# Patient Record
Sex: FEMALE | Race: WHITE | NOT HISPANIC OR LATINO | Employment: FULL TIME | URBAN - METROPOLITAN AREA
[De-identification: names, ages, dates, MRNs, and addresses within clinical notes are randomized per-mention and may not be internally consistent; named-entity substitution may affect disease eponyms.]

---

## 2018-04-24 ENCOUNTER — TELEPHONE (OUTPATIENT)
Dept: OTHER | Facility: CLINIC | Age: 28
End: 2018-04-24

## 2018-04-24 NOTE — TELEPHONE ENCOUNTER
4/24/2018    Call Regarding Onboarding [facility] CHOICE    Attempt 1    Message on voicemail     Comments:           Outreach   AT

## 2018-05-15 NOTE — TELEPHONE ENCOUNTER
05/15/2018     Call Regarding Onboarding  [facility] choices     Attempt 2    Message on voicemail    Comments:       Outreach   Ava Chandler

## 2022-03-06 ENCOUNTER — TRANSFERRED RECORDS (OUTPATIENT)
Dept: MULTI SPECIALTY CLINIC | Facility: CLINIC | Age: 32
End: 2022-03-06

## 2024-04-10 ENCOUNTER — OFFICE VISIT (OUTPATIENT)
Dept: FAMILY MEDICINE | Facility: CLINIC | Age: 34
End: 2024-04-10
Payer: COMMERCIAL

## 2024-04-10 VITALS
RESPIRATION RATE: 18 BRPM | OXYGEN SATURATION: 99 % | SYSTOLIC BLOOD PRESSURE: 111 MMHG | BODY MASS INDEX: 25.3 KG/M2 | HEIGHT: 70 IN | HEART RATE: 68 BPM | DIASTOLIC BLOOD PRESSURE: 76 MMHG | WEIGHT: 176.7 LBS | TEMPERATURE: 98.6 F

## 2024-04-10 DIAGNOSIS — M25.551 HIP PAIN, RIGHT: Primary | ICD-10-CM

## 2024-04-10 DIAGNOSIS — G89.29 CHRONIC PAIN OF RIGHT KNEE: ICD-10-CM

## 2024-04-10 DIAGNOSIS — R21 RASH AND NONSPECIFIC SKIN ERUPTION: ICD-10-CM

## 2024-04-10 DIAGNOSIS — M25.561 CHRONIC PAIN OF RIGHT KNEE: ICD-10-CM

## 2024-04-10 PROCEDURE — 99203 OFFICE O/P NEW LOW 30 MIN: CPT | Performed by: INTERNAL MEDICINE

## 2024-04-10 ASSESSMENT — PAIN SCALES - GENERAL: PAINLEVEL: NO PAIN (0)

## 2024-04-10 ASSESSMENT — ENCOUNTER SYMPTOMS: HIP PAIN: 1

## 2024-04-10 NOTE — COMMUNITY RESOURCES LIST (ENGLISH)
April 10, 2024           YOUR PERSONALIZED LIST OF SERVICES & PROGRAMS           & SHELTER    Housing      [address] [place] [address] - Violence Prevention Services - Domestic Violence Shelter  CHRISTIANNE Paulson [address] (Distance: 11.1 miles)  Phone: (228) 673-3254  Website: https://[address] [place] [address].org/  Language: English      [facility] - Shelter for individuals  [address] [place] [address] (Distance: 7.5 miles)  Language: English, Senegalese  Fee: Free      [facility] - Housing Stabilization Services  Phone: (314) 804-9073  Website: https://mimoOn/Housing-Stabilization.html  Language: English  Hours: Mon 9:00 AM - 5:00 PM Tue 9:00 AM - 5:00 PM Wed 9:00 AM - 5:00 PM Thu 9:00 AM - 5:00 PM Fri 9:00 AM - 5:00 PM  Fee: Insurance  Accessibility: Deaf or hard of hearing, Translation services    Case Management      [facility] - Housing search assistance  [address] [place] [address] (Distance: 9.2 miles)  Phone: (299) 853-8508  Language: English  Fee: Sliding scale, Self pay      [facility] - Housing search assistance  [address] [place] [address] (Distance: 17.1 miles)  Phone: (498) 214-6854  Language: English  Fee: Free, Sliding scale, Insurance  Accessibility: Translation services, [place] accessible, Blind accommodation, Deaf or hard of hearing      [facility] - Housing Stabilization Services  Phone: (531) 411-1206  Website: https://homebasemn.com/  Language: English  Hours: Mon 8:00 AM - 4:00 PM Tue 8:00 AM - 4:00 PM Wed 8:00 AM - 4:00 PM Thu 8:00 AM - 4:00 PM Fri 8:00 AM - 4:00 PM  Fee: Free  Accessibility: Blind accommodation, Deaf or hard of hearing  Transportation Options: Free transportation    Drop-In Services      [facility] - Warming or cooling center - [facility] - [employer]  [address] [place] [address] (Distance: 5.4 miles)  Language: English, Senegalese, [place] citizen  Fee:  Free      [facility] - Project Recovery  [address] [place] [address] [address] [place] [address] (Distance: 22.6 miles)  Phone: (143) [phone]  Language: English  Fee: Free      [facility]UNDRY LOVE  Website: http://www.laundrylove.org               IMPORTANT NUMBERS & WEBSITES        Emergency Services  911  .   [facility]  211 http://211unitedway.org  .   Poison Control  (562) 770-7363 http://[place]poison.org http://wis[place]poison.org  .     Suicide and Crisis Lifeline  988 http://988lifeline.org  .   [facility]help [place] Child Abuse Hotline  877.250.1699 http://Childhelphotline.org   .   National Sexual Assault Hotline  (795) 930-9783 (HOPE) http://Gigaclear.org   .     [place] Runaway Safeline  (892) 917-6928 (RUNAWAY) http://Sometrics.Networked Insights  .   Pregnancy & Postpartum Support  Call/text 762-699-3312  [place]: http://ppsupportmn.org  [place]: http://Attentio.com/wi  .   Substance Abuse National Helpline ([facility])  623-055-HELP (1558) http://Findtreatment.gov   .                DISCLAIMER: These resources have been generated via the [employer] Platform. [employer] does not endorse any service providers mentioned in this resource list. [employer] does not guarantee that the services mentioned in this resource list will be available to you or will improve your health or wellness.    [facility]

## 2024-04-10 NOTE — PROGRESS NOTES
"  Assessment & Plan   Problem List Items Addressed This Visit    None  Visit Diagnoses       Hip pain, right    -  Primary    Relevant Orders    Orthopedic  Referral    Physical Therapy  Referral    Chronic pain of right knee        Relevant Orders    Orthopedic  Referral    Physical Therapy  Referral    Rash and nonspecific skin eruption               Discussed differential of skin rash on the hands possible eczema/dermatitis versus postviral such as with coxsackievirus ,she does not have all the features of hand-foot-and-mouth disease and she did not have any preceding Viral symptoms, we will treat conservatively now with topical creams; moisturizers such as Aquaphor or Eucerin cream ,apply as needed especially after washing and can apply hydrocortisone 1% anti-itch cream on the lesions that are painful.  No joint involvement no systemic signs ,if rash recurs patient was asked to send us photos through [employer].    As for the right hip pain and right knee pain probably they are separate, but we will have sports medicine and PT evaluate ,patient exam was benign except for hyperflexion of the right hip with internal rotation elicited some discomfort.  Could be any from ligamentous injury to possible labral tear, I do not see evidence of synovitis from history or exam of the knee or the hip.  IT band syndrome unlikely given the history, the knee differential could be also meniscal or ligamentous injury versus some degenerative knee changes ; she is a long-term runner but the knee exam is benign and stable knee on exam ,can apply topical diclofenac gel as needed.  Can take anti-inflammatories such as Motrin Advil as needed over-the-counter.  Further recommendation per sports medicine evaluation     BMI  Estimated body mass index is 25.51 kg/m  as calculated from the following:    Height as of this encounter: 1.772 m (5' 9.78\").    Weight as of this encounter: 80.2 kg (176 lb 11.2 " oz).             Subjective   Letty is a 33 year old, presenting for the following health issues:  Hip Pain (2 PAIN SCORE, 4 wks ), Hand Problem (BUMPS, pain with movement, 8wks ), and Health Maintenance (CONI for gina physicians signed)        4/10/2024    10:25 AM   Additional Questions   Roomed by Lorna   Accompanied by Not applicable, by themselves     Via the Health Maintenance questionnaire, the patient has reported the following services have been completed -Cervical Cancer Screening, this information has been sent to the abstraction team.  History of Present Illness       Reason for visit:  Hip pain and painful hand bumps  Symptom onset:  3-4 weeks ago  Symptoms include:  Hip pain and painful bumps on hands  Symptom intensity:  Moderate  Symptom progression:  Staying the same  Had these symptoms before:  No  What makes it worse:  Hip-running,hands-none  What makes it better:  Hip-none hands-none    She eats 0-1 servings of fruits and vegetables daily.She consumes 0 sweetened beverage(s) daily.She exercises with enough effort to increase her heart rate 60 or more minutes per day.  She exercises with enough effort to increase her heart rate 6 days per week.   She is taking medications regularly.     Letty seeing first time in the clinic presenting for evaluation of #1 rash bumps on the hands  2.  Right groin hip pain  3.  Right knee pain.  The rash bumps on the hands noticed over the last 8 weeks between the knuckles mainly in the webspace around the thumb area bilateral.  Describes more of painful lesions/ bumps rather than itching.  This has been going on for the last 8 weeks.  also has formed a bit of ?scarring per pt.  No use of new detergent or soaps or creams or gloves.  No associated  joint pain or swelling.  No fever or chills.  No preceding URI symptoms.  Onset of 8 weeks duration.  She has a 2-year-old child has no similar rashes.  No history of STDs.  No intake of new medications.  Has an IUD  "in place.  Right hip pain located mainly in the groin area exacerbated by running she does running for many years.  Does affect her walking as well has some limp currently, she is resting from sports activity/running.  Has history of low back pain in the past.  The pain does not radiate from the joint hip area down to the knee.  Also has some knee issues dull pain on the right side ,no clicking sensation, no giving away sensation ,no locking sensation.  No swelling in the knee.  No known hip or knee injury in particular.  Hip pain onset was during the 10 mile run that she had couple weeks ago and after that she could not run or bear weight for a week after.  There is no known particular knee injury.    Review of Systems  Constitutional, HEENT, cardiovascular, pulmonary, gi and gu systems are negative, except as otherwise noted.      Objective    /76 (BP Location: Right arm, Patient Position: Sitting, Cuff Size: Adult Regular)   Pulse 68   Temp 98.6  F (37  C) (Temporal)   Resp 18   Ht 1.772 m (5' 9.78\")   Wt 80.2 kg (176 lb 11.2 oz)   SpO2 99%   BMI 25.51 kg/m    Body mass index is 25.51 kg/m .  Physical Exam   GENERAL: alert and no distress    ABDOMEN: soft, nontender, no hepatosplenomegaly, no masses and bowel sounds normal  MS: no gross musculoskeletal defects noted, no edema, examined.    Anterior and posterior drawer test were negative , varus/valgus test negative, Elias's test negative ,PIOTR test was negative.  She has some limitation with super flexion of the right proximal thigh/hip area and internal rotation-elicited some discomfort.  SKIN: no suspicious lesions or rashes  NEURO: Normal strength and tone, mentation intact and speech normal, good motor tone motor power 5/5 proximal distal muscles.  PSYCH: mentation appears normal, affect normal/bright    Office Visit on 11/03/2015   Component Date Value Ref Range Status    Specimen Description 11/03/2015 Throat   Final    Rapid Strep A " Screen 11/03/2015    Final                    Value:NEGATIVE: No Group A streptococcal antigen detected by immunoassay, await   culture report.      Micro Report Status 11/03/2015 FINAL 11/03/2015   Final    Specimen Description 11/03/2015 Nasal   Final    Influenza A PCR 11/03/2015   NEG Final                    Value:Negative   Flu A target RNA not detected, presumed negative for Influenza A or the viral   load is below the limit of detection.      Influenza B PCR 11/03/2015   NEG Final                    Value:Negative   Flu B target RNA not detected, presumed negative for Influenza B or the viral   load is below the limit of detection.      Resp Syncytial Virus 11/03/2015   NEG Final                    Value:Negative   RSV target RNA not detected, presumed negative for Respiratory Syncitial Virus   or the viral load is below the limit of detection.   FDA approved assay performed using Cepheid GeneXpert(R) real-time PCR.      WBC 11/03/2015 12.0 (H)  4.0 - 11.0 10e9/L Final    RBC Count 11/03/2015 4.70  3.8 - 5.2 10e12/L Final    Hemoglobin 11/03/2015 14.6  11.7 - 15.7 g/dL Final    Hematocrit 11/03/2015 44.3  35.0 - 47.0 % Final    MCV 11/03/2015 94  78 - 100 fl Final    MCH 11/03/2015 31.1  26.5 - 33.0 pg Final    MCHC 11/03/2015 33.0  31.5 - 36.5 g/dL Final    RDW 11/03/2015 13.2  10.0 - 15.0 % Final    Platelet Count 11/03/2015 273  150 - 450 10e9/L Final    Diff Method 11/03/2015 Automated Method   Final    % Neutrophils 11/03/2015 78.7  % Final    % Lymphocytes 11/03/2015 11.8  % Final    % Monocytes 11/03/2015 9.0  % Final    % Eosinophils 11/03/2015 0.3  % Final    % Basophils 11/03/2015 0.2  % Final    Absolute Neutrophil 11/03/2015 9.5 (H)  1.6 - 8.3 10e9/L Final    Absolute Lymphocytes 11/03/2015 1.4  0.8 - 5.3 10e9/L Final    Absolute Monocytes 11/03/2015 1.1  0.0 - 1.3 10e9/L Final    Absolute Eosinophils 11/03/2015 0.0  0.0 - 0.7 10e9/L Final    Absolute Basophils 11/03/2015 0.0  0.0 - 0.2 10e9/L  Final    Specimen Description 11/03/2015 Throat   Final    Culture Micro 11/03/2015 No Beta Streptococcus isolated   Final    Micro Report Status 11/03/2015 FINAL 11/05/2015   Final           Signed Electronically by: José Clark MD

## 2024-04-11 ENCOUNTER — OFFICE VISIT (OUTPATIENT)
Dept: ORTHOPEDICS | Facility: CLINIC | Age: 34
End: 2024-04-11
Payer: COMMERCIAL

## 2024-04-11 ENCOUNTER — ANCILLARY PROCEDURE (OUTPATIENT)
Dept: GENERAL RADIOLOGY | Facility: CLINIC | Age: 34
End: 2024-04-11
Attending: PHYSICIAN ASSISTANT
Payer: COMMERCIAL

## 2024-04-11 VITALS — WEIGHT: 176 LBS | HEIGHT: 69 IN | BODY MASS INDEX: 26.07 KG/M2

## 2024-04-11 DIAGNOSIS — M25.561 RIGHT KNEE PAIN: ICD-10-CM

## 2024-04-11 DIAGNOSIS — M25.551 RIGHT HIP PAIN: ICD-10-CM

## 2024-04-11 DIAGNOSIS — S76.011A HIP STRAIN, RIGHT, INITIAL ENCOUNTER: Primary | ICD-10-CM

## 2024-04-11 DIAGNOSIS — S86.911A KNEE STRAIN, RIGHT, INITIAL ENCOUNTER: ICD-10-CM

## 2024-04-11 PROCEDURE — 99203 OFFICE O/P NEW LOW 30 MIN: CPT | Performed by: PHYSICIAN ASSISTANT

## 2024-04-11 PROCEDURE — 73562 X-RAY EXAM OF KNEE 3: CPT | Mod: TC | Performed by: RADIOLOGY

## 2024-04-11 PROCEDURE — 73560 X-RAY EXAM OF KNEE 1 OR 2: CPT | Mod: TC | Performed by: RADIOLOGY

## 2024-04-11 PROCEDURE — 73501 X-RAY EXAM HIP UNI 1 VIEW: CPT | Mod: TC | Performed by: RADIOLOGY

## 2024-04-11 NOTE — PROGRESS NOTES
"ASSESSMENT & PLAN  Patient Instructions     1. Hip strain, right, initial encounter    2. Knee strain, right, initial encounter        - Patient presents with right hip and knee pain without a mechanism of injury. We reviewed possible differential including muscle strain versus labral pathology. At this time, I would recommend to begin physical therapy. She has a referral for this and encourage to have this set up. We discussed if symptoms are not improving with therapy, to follow up for additional work up which may include an MRI.  -Ibuprofen 600 mg up to three times a day as needed for pain  -Ice/heat, apply as needed  -Follow up in 6-8 weeks after physical therapy with Dr. Jacob should symptoms not improve or worsen    -Call direct clinic number [[phone]] at any time with questions or concerns.        Polina Avalos PA-C  [facility]    -----  Chief Complaint   Patient presents with    Right Hip - Pain    Right Knee - Pain       SUBJECTIVE  Letty THORNE is a/an 33 year old female who is seen as a WALK IN patient for evaluation of right hip and right knee pain.  Onset was 4 weeks ago when patient went for a run and had recently changed her shoes. Patient reports she has been limping up until last week. Pain on her hip is anterior and \"deep in joint\" and pain on her knee is lateral condyle. Symptoms are worsened by walking and running, up and down stairs.  She has tried resting. Associated symptoms include possible weakness and giving out with her hip.    The patient is seen alone    Prior injury/Surgical history of affected joint: budging disc   Social History/Occupation:     REVIEW OF SYSTEMS:  Pertinent positives/negative: As stated above in HPI    OBJECTIVE:  Ht 1.753 m (5' 9\")   Wt 79.8 kg (176 lb)   BMI 25.99 kg/m     General: Alert and in no distress  Skin: no visable rashes  CV: Extremities appear well perfused   Resp: normal respiratory effort, " no conversational dyspnea   Psych: normal mood, affect  MSK: RIGHT Hip Exam    Inspection:  There is no evidence of open wounds  There is no evidence of erythema or infection  There is no obvious ecchymosis    Palpation:  There is no palpable fluctuance  There is tenderness mild tenderness at the lateral greater trochanter and right SI Joint.     Range of motion:   Flexion: 100   Internal rotation: 15  External rotation: 30    Strength:   Flexion: 5/5  Abduction: 5/5  Adduction: 5/5    Negative Log roll Test    FADER Negative  FADIR Positive    Dorsalis pedis pulse is palpable and equal to contralateral side    Sensation is intact to light touch in the bilateral lower extremities    RIGHT KNEE    Inspection:  There is no evidence of open wounds.   There is no evidence of erythema or infection  There is no appreciable swelling or synovitis.    Palpation:  There is no palpable fluctuance  There is no tenderness to palpation about the knee.    Strength:  Knee flexion: 5/5  Knee extension: 5/5  Extensor mechanism intact    Sensory:  Intact to light touch in the lower extremity bilaterally     Dorsalis pedis pulse is palpable and equal to contralateral side    Range of Motion:  Flexion: 135 degrees  Extension: 0 degrees    Examination Maneuvers:  Elias's: -  Anterior drawer: -  Posterior drawer: -    Stable to varus and valgus stress at 0 and 30 degrees of flexion       RADIOLOGY:  Final results and radiologist's interpretation available in the [facility] health record.  Images below were personally reviewed and discussed with the patient in the office today.  My personal interpretation of the performed imaging:   Right hip x-rays demonstrate no acute fracture or dislocation.  Right knee x-rays demonstrated no acute fracture or dislocation.

## 2024-04-11 NOTE — PATIENT INSTRUCTIONS
1. Hip strain, right, initial encounter    2. Knee strain, right, initial encounter        - Patient presents with right hip and knee pain without a mechanism of injury. We reviewed possible differential including muscle strain versus labral pathology. At this time, I would recommend to begin physical therapy. She has a referral for this and encourage to have this set up. We discussed if symptoms are not improving with therapy, to follow up for additional work up which may include an MRI versus a steroid injection.  -Ibuprofen 600 mg up to three times a day as needed for pain  -Ice/heat, apply as needed  -Follow up in 6-8 weeks after physical therapy with Dr. Jacob should symptoms not improve or worsen    -Call direct clinic number [[phone]] at any time with questions or concerns.

## 2024-04-11 NOTE — LETTER
"    4/11/2024         RE: Letty Thorne  [address] [address]        Dear Colleague,    Thank you for referring your patient, Letty Thorne, to the [facility] [place]. Please see a copy of my visit note below.    ASSESSMENT & PLAN  Patient Instructions     1. Hip strain, right, initial encounter    2. Knee strain, right, initial encounter        - Patient presents with right hip and knee pain without a mechanism of injury. We reviewed possible differential including muscle strain versus labral pathology. At this time, I would recommend to begin physical therapy. She has a referral for this and encourage to have this set up. We discussed if symptoms are not improving with therapy, to follow up for additional work up which may include an MRI.  -Ibuprofen 600 mg up to three times a day as needed for pain  -Ice/heat, apply as needed  -Follow up in 6-8 weeks after physical therapy with Dr. Jacob should symptoms not improve or worsen    -Call direct clinic number [[phone]] at any time with questions or concerns.        Polina Avalos PA-C  [facility]    -----  Chief Complaint   Patient presents with     Right Hip - Pain     Right Knee - Pain       SUBJECTIVE  Letty THORNE is a/an 33 year old female who is seen as a WALK IN patient for evaluation of right hip and right knee pain.  Onset was 4 weeks ago when patient went for a run and had recently changed her shoes. Patient reports she has been limping up until last week. Pain on her hip is anterior and \"deep in joint\" and pain on her knee is lateral condyle. Symptoms are worsened by walking and running, up and down stairs.  She has tried resting. Associated symptoms include possible weakness and giving out with her hip.    The patient is seen alone    Prior injury/Surgical history of affected joint: budging disc   Social History/Occupation:     REVIEW OF " "SYSTEMS:  Pertinent positives/negative: As stated above in HPI    OBJECTIVE:  Ht 1.753 m (5' 9\")   Wt 79.8 kg (176 lb)   BMI 25.99 kg/m     General: Alert and in no distress  Skin: no visable rashes  CV: Extremities appear well perfused   Resp: normal respiratory effort, no conversational dyspnea   Psych: normal mood, affect  MSK: RIGHT Hip Exam    Inspection:  There is no evidence of open wounds  There is no evidence of erythema or infection  There is no obvious ecchymosis    Palpation:  There is no palpable fluctuance  There is tenderness mild tenderness at the lateral greater trochanter and right SI Joint.     Range of motion:   Flexion: 100   Internal rotation: 15  External rotation: 30    Strength:   Flexion: 5/5  Abduction: 5/5  Adduction: 5/5    Negative Log roll Test    Dorsalis pedis pulse is palpable and equal to contralateral side    Sensation is intact to light touch in the bilateral lower extremities    RIGHT KNEE    Inspection:  There is no evidence of open wounds.   There is no evidence of erythema or infection  There is no appreciable swelling or synovitis.    Palpation:  There is no palpable fluctuance  There is no tenderness to palpation about the knee.    Strength:  Knee flexion: 5/5  Knee extension: 5/5  Extensor mechanism intact    Sensory:  Intact to light touch in the lower extremity bilaterally     Dorsalis pedis pulse is palpable and equal to contralateral side    Range of Motion:  Flexion: 135 degrees  Extension: 0 degrees    Examination Maneuvers:  Elias's: -  Anterior drawer: -  Posterior drawer: -    Stable to varus and valgus stress at 0 and 30 degrees of flexion       RADIOLOGY:  Final results and radiologist's interpretation available in the [facility] health record.  Images below were personally reviewed and discussed with the patient in the office today.  My personal interpretation of the performed imaging:   Right hip x-rays demonstrate no acute fracture or dislocation.  Right knee " x-rays demonstrated no acute fracture or dislocation.          Again, thank you for allowing me to participate in the care of your patient.        Sincerely,        Polina Avalos PA-C

## 2024-04-18 DIAGNOSIS — M25.551 HIP PAIN, RIGHT: Primary | ICD-10-CM

## 2024-04-18 DIAGNOSIS — G89.29 CHRONIC PAIN OF RIGHT KNEE: ICD-10-CM

## 2024-04-18 DIAGNOSIS — M25.561 CHRONIC PAIN OF RIGHT KNEE: ICD-10-CM

## 2024-05-26 ENCOUNTER — HEALTH MAINTENANCE LETTER (OUTPATIENT)
Age: 34
End: 2024-05-26

## 2024-07-18 ENCOUNTER — TELEPHONE (OUTPATIENT)
Dept: FAMILY MEDICINE | Facility: CLINIC | Age: 34
End: 2024-07-18
Payer: COMMERCIAL

## 2024-07-18 NOTE — CONFIDENTIAL NOTE
Patient Quality Outreach    Patient is due for the following:   Cervical Cancer Screening - PAP Needed  Physical Preventive Adult Physical    Next Steps:   Schedule a Adult Preventative    Type of outreach:    Sent [employer] message.      Questions for provider review:    None           Vipul Lan MA

## 2025-06-14 ENCOUNTER — HEALTH MAINTENANCE LETTER (OUTPATIENT)
Age: 35
End: 2025-06-14